# Patient Record
Sex: FEMALE | Race: WHITE
[De-identification: names, ages, dates, MRNs, and addresses within clinical notes are randomized per-mention and may not be internally consistent; named-entity substitution may affect disease eponyms.]

---

## 2018-11-26 ENCOUNTER — HOSPITAL ENCOUNTER (EMERGENCY)
Dept: HOSPITAL 88 - ER | Age: 33
Discharge: LEFT BEFORE DISCHARGE | End: 2018-11-26
Payer: COMMERCIAL

## 2018-11-26 DIAGNOSIS — F15.150: ICD-10-CM

## 2018-11-26 DIAGNOSIS — R41.82: Primary | ICD-10-CM

## 2018-11-26 DIAGNOSIS — K52.9: ICD-10-CM

## 2018-11-26 DIAGNOSIS — E86.0: ICD-10-CM

## 2018-11-26 LAB
ALBUMIN SERPL-MCNC: 3.9 G/DL (ref 3.5–5)
ALBUMIN/GLOB SERPL: 1.1 {RATIO} (ref 0.8–2)
ALP SERPL-CCNC: 88 IU/L (ref 40–150)
ALT SERPL-CCNC: 17 IU/L (ref 0–55)
AMPHETAMINES UR QL SCN>1000 NG/ML: POSITIVE
ANION GAP SERPL CALC-SCNC: 13.4 MMOL/L (ref 8–16)
BASOPHILS # BLD AUTO: 0.1 10*3/UL (ref 0–0.1)
BASOPHILS NFR BLD AUTO: 0.3 % (ref 0–1)
BENZODIAZ UR QL SCN: NEGATIVE
BUN SERPL-MCNC: 20 MG/DL (ref 7–26)
BUN/CREAT SERPL: 24 (ref 6–25)
CALCIUM SERPL-MCNC: 9.5 MG/DL (ref 8.4–10.2)
CHLORIDE SERPL-SCNC: 101 MMOL/L (ref 98–107)
CO2 SERPL-SCNC: 24 MMOL/L (ref 22–29)
DEPRECATED NEUTROPHILS # BLD AUTO: 14.1 10*3/UL (ref 2.1–6.9)
EGFRCR SERPLBLD CKD-EPI 2021: > 60 ML/MIN (ref 60–?)
EOSINOPHIL # BLD AUTO: 0.2 10*3/UL (ref 0–0.4)
EOSINOPHIL NFR BLD AUTO: 1.5 % (ref 0–6)
ERYTHROCYTE [DISTWIDTH] IN CORD BLOOD: 11.9 % (ref 11.7–14.4)
GLOBULIN PLAS-MCNC: 3.6 G/DL (ref 2.3–3.5)
GLUCOSE SERPLBLD-MCNC: 93 MG/DL (ref 74–118)
HCT VFR BLD AUTO: 49.3 % (ref 34.2–44.1)
HGB BLD-MCNC: 16.3 G/DL (ref 12–16)
LYMPHOCYTES # BLD: 0.8 10*3/UL (ref 1–3.2)
LYMPHOCYTES NFR BLD AUTO: 5.1 % (ref 18–39.1)
MCH RBC QN AUTO: 29.2 PG (ref 28–32)
MCHC RBC AUTO-ENTMCNC: 33.1 G/DL (ref 31–35)
MCV RBC AUTO: 88.4 FL (ref 81–99)
MONOCYTES # BLD AUTO: 0.5 10*3/UL (ref 0.2–0.8)
MONOCYTES NFR BLD AUTO: 3.4 % (ref 4.4–11.3)
NEUTS SEG NFR BLD AUTO: 89.3 % (ref 38.7–80)
PCP UR QL SCN: NEGATIVE
PLATELET # BLD AUTO: 291 X10E3/UL (ref 140–360)
POTASSIUM SERPL-SCNC: 3.4 MMOL/L (ref 3.5–5.1)
RBC # BLD AUTO: 5.58 X10E6/UL (ref 3.6–5.1)
SODIUM SERPL-SCNC: 135 MMOL/L (ref 136–145)

## 2018-11-26 PROCEDURE — 81025 URINE PREGNANCY TEST: CPT

## 2018-11-26 PROCEDURE — 36415 COLL VENOUS BLD VENIPUNCTURE: CPT

## 2018-11-26 PROCEDURE — 80307 DRUG TEST PRSMV CHEM ANLYZR: CPT

## 2018-11-26 PROCEDURE — 80053 COMPREHEN METABOLIC PANEL: CPT

## 2018-11-26 PROCEDURE — 99282 EMERGENCY DEPT VISIT SF MDM: CPT

## 2018-11-26 PROCEDURE — 85025 COMPLETE CBC W/AUTO DIFF WBC: CPT

## 2018-11-26 PROCEDURE — 82948 REAGENT STRIP/BLOOD GLUCOSE: CPT

## 2018-11-26 NOTE — XMS REPORT
Patient Summary Document

                             Created on: 2018



VIRGIE CHRISTIANSEN

External Reference #: [id number]

: 1985

Sex: Female



Demographics







                          Address                   [address] [place] [address]

 

                          Home Phone                [phone]

 

                          Preferred Language        Unknown

 

                          Marital Status            Unknown

 

                          Restorationism Affiliation     Unknown

 

                          Race                      Unknown

 

                                        Additional Race(s)  

 

                          Ethnic Group              Unknown





Author







                          Author                    [facility]

 

                          Address                   Unknown

 

                          Phone                     Unavailable







Care Team Providers







                    Care Team Member Name    Role                Phone

 

                    SWEET, A LAIRD      Unavailable         Unavailable







Problems

This patient has no known problems.



Allergies, Adverse Reactions, Alerts

This patient has no known allergies or adverse reactions.



Medications

This patient has no known medications.



Results







           Test Description    Test Time    Test Comments    Text Results    Atomic Results    Result

 Comments

 

                CHEST SINGLE (PORTABLE)                                        [place] [address]      Patient Name: 
VIRGIE CHRISTIANSEN   MR #: [id number]    : 1985 Age/Sex: 32/F  Acct #: 
[id number] Req #: [id number]  Adm Physician:     Ordered by: FREDDIE PASCUAL  Report #: [id number]   Location: ER  Room/Bed:     
_______________________________________________________________________
____________________________    Procedure: [id number] DX/CHEST SINGLE (PORTABLE) 
Exam Date: 17                            Exam Time:        REPORT 
STATUS: Signed    CHEST SINGLE (PORTABLE), 2017 7:58 PM      Technique: 
CHEST SINGLE (PORTABLE)   Comparison: None available.   Clinical history: Asthma
     Findings:   Unremarkable appearance of the heart, mediastinum, lungs and 
pleural spaces.      Impression:   1. Lines/Tubes: None   2. No acute 
abnormality.      Signed by: Dr Florencio Villatoro MD on 2017 8:33 PM        
Dictated By: FLORENCIO VILLATORO MD  Electronically Signed By: FLORENCIO VILLATORO MD on
17  Transcribed By: OTIS on 17       COPY TO:   
FREDDIE PASCUAL                    

 

                CT ABDOMEN/PELVIS W                                        [place] [address] [place] [address]      Patient Name: VIRGIE CHRISTIANSEN   MR #: [id number]    : 1985 Age/Sex: 32/F  Acct #: [id number] Req 
#: [id number]  Adm Physician:     Ordered by: NATALIE SEPULVEDA MD  Report #: [id number]
[id number]   Location: ER  Room/Bed:     
____________________________________________________________________________
_______________________    Procedure: [id number] CT/CT ABDOMEN/PELVIS W  Exam 
Date: 09/10/17                            Exam Time: 0300       REPORT STATUS: 
Signed    EXAM: CT Abdomen and Pelvis WITH contrast     INDICATION: Left lower 
quadrant abdominal pain   COMPARISON: 2011   TECHNIQUE: Abdomen and pelvis 
were scanned utilizing a multidetector helical   scanner from the lung base to 
the pubic symphysis after administration of IV   contrast. Coronal and sagittal 
reformations were obtained. Routine protocol was   performed. Scan was performed
when during portal venous phase.               IV CONTRAST: 100 mL of Isovue-370
              ORAL CONTRAST: Water               RADIATION DOSE: Total DLP: 
293.04 mGy*cm                Estimated effective dose: (DLP x 0.015 x size 
factor) mSv               COMPLICATIONS: None      FINDINGS:      LINES and 
TUBES: None.      LOWER THORAX:  Unremarkable      HEPATOBILIARY:      No focal 
hepatic lesions. No biliary ductal dilation.       GALLBLADDER: No radio-opaque 
stones or sludge.  No wall thickening.      SPLEEN: No splenomegaly.       
PANCREAS: No focal masses or ductal dilatation.        ADRENALS: No adrenal 
nodules          KIDNEYS/URETERS: Kidneys enhance symmetrically.  No 
hydronephrosis. No cystic   or solid mass lesions.  No stones.      GI TRACT: No
abnormal distention, wall thickening, or evidence of bowel   obstruction.  
Moderate amount of stool throughout the colon compatible with   constipation.  
Appendix is not clearly identified. There is however no fat   stranding or 
adenopathy in the right lower quadrant to suggest appendicitis.      PELVIC 
ORGANS/BLADDER: Remarkable for IUD within the endometrial cavity..      LYMPH 
NODES: No lymphadenopathy.      VESSELS: Unremarkable.      PERITONEUM / 
RETROPERITONEUM: No free air or fluid.      BONES: Unremarkable.      SOFT 
TISSUES: Unremarkable.                  IMPRESSION:    1.  Mild to moderate 
constipation.   2.  Otherwise, unremarkable CT of the abdomen and pelvis      
Signed by: Dr. Atif Butt M.D. on 9/10/2017 3:48 AM        Dictated By: ATIF HELLER MD  Electronically Signed By: ATIF HELLER MD on 09/10/17 
0348  Transcribed By: OTIS on 09/10/17 0348       COPY TO:   NATALIE SEPULVEDA MD